# Patient Record
Sex: FEMALE | Race: BLACK OR AFRICAN AMERICAN | NOT HISPANIC OR LATINO | Employment: UNEMPLOYED | URBAN - METROPOLITAN AREA
[De-identification: names, ages, dates, MRNs, and addresses within clinical notes are randomized per-mention and may not be internally consistent; named-entity substitution may affect disease eponyms.]

---

## 2024-02-05 ENCOUNTER — TELEPHONE (OUTPATIENT)
Dept: OBSTETRICS AND GYNECOLOGY | Facility: CLINIC | Age: 59
End: 2024-02-05
Payer: COMMERCIAL

## 2024-02-05 NOTE — TELEPHONE ENCOUNTER
----- Message from Deepika Monroy sent at 2/5/2024 11:55 AM CST -----  Contact: Patient  Type:  Sooner Apoointment Request    Caller is requesting a sooner appointment.  Caller declined first available appointment listed below.  Caller will not accept being placed on the waitlist and is requesting a message be sent to doctor.  Name of Caller:Adri Pérez   When is the first available appointment?07/30  Symptoms:annual   Would the patient rather a call back or a response via [email or web address]? Call back   Best Call Back Number:[phone]  Additional Information: patient prefers the appointment to be on a Tuesday

## 2024-02-27 ENCOUNTER — OFFICE VISIT (OUTPATIENT)
Dept: OBSTETRICS AND GYNECOLOGY | Facility: CLINIC | Age: 59
End: 2024-02-27
Payer: COMMERCIAL

## 2024-02-27 VITALS
HEART RATE: 92 BPM | WEIGHT: 180.63 LBS | HEIGHT: 65 IN | DIASTOLIC BLOOD PRESSURE: 75 MMHG | SYSTOLIC BLOOD PRESSURE: 126 MMHG | BODY MASS INDEX: 30.09 KG/M2

## 2024-02-27 DIAGNOSIS — Z12.4 SCREENING FOR MALIGNANT NEOPLASM OF CERVIX: ICD-10-CM

## 2024-02-27 DIAGNOSIS — Z12.31 SCREENING MAMMOGRAM FOR BREAST CANCER: ICD-10-CM

## 2024-02-27 DIAGNOSIS — R53.83 FATIGUE, UNSPECIFIED TYPE: ICD-10-CM

## 2024-02-27 DIAGNOSIS — Z01.419 WELL WOMAN EXAM WITH ROUTINE GYNECOLOGICAL EXAM: Primary | ICD-10-CM

## 2024-02-27 DIAGNOSIS — R23.2 HOT FLASHES: ICD-10-CM

## 2024-02-27 DIAGNOSIS — Z13.820 SCREENING FOR OSTEOPOROSIS: ICD-10-CM

## 2024-02-27 PROCEDURE — 1159F MED LIST DOCD IN RCRD: CPT | Mod: CPTII,S$GLB,,

## 2024-02-27 PROCEDURE — 99386 PREV VISIT NEW AGE 40-64: CPT | Mod: S$GLB,,,

## 2024-02-27 PROCEDURE — 3074F SYST BP LT 130 MM HG: CPT | Mod: CPTII,S$GLB,,

## 2024-02-27 PROCEDURE — 3078F DIAST BP <80 MM HG: CPT | Mod: CPTII,S$GLB,,

## 2024-02-27 PROCEDURE — 1160F RVW MEDS BY RX/DR IN RCRD: CPT | Mod: CPTII,S$GLB,,

## 2024-02-27 PROCEDURE — 3008F BODY MASS INDEX DOCD: CPT | Mod: CPTII,S$GLB,,

## 2024-02-27 RX ORDER — DULOXETIN HYDROCHLORIDE 60 MG/1
60 CAPSULE, DELAYED RELEASE ORAL 2 TIMES DAILY
COMMUNITY
Start: 2023-12-27

## 2024-02-27 RX ORDER — MELOXICAM 15 MG/1
15 TABLET ORAL
COMMUNITY
Start: 2023-12-27

## 2024-02-27 RX ORDER — AMLODIPINE BESYLATE 10 MG/1
10 TABLET ORAL
COMMUNITY
Start: 2023-12-26

## 2024-02-27 NOTE — PROGRESS NOTES
"  Subjective:      Patient ID: Adri Pérez is a 59 y.o. female who presents for evaluation today.    Chief Complaint:    Well Woman      History of Present Illness  HPI  Annual Exam (Postmenopausal) - Patient presents for annual exam. The patient has also has complaints of decreased libido. The patient is sexually active. GYN screening history: last pap: was normal and last mammogram: was normal. The patient is not taking hormone replacement therapy. Patient denies post-menopausal vaginal bleeding. The patient wears seatbelts: yes. The patient participates in regular exercise: yes. Has the patient ever been transfused or tattooed?: not asked. The patient reports that there is not domestic violence in her life. She reports decreased libido for many years. She is also on cymbalta for anxiety & depression, losartan for HTN. She sees Dr. Santana for management. She denies vaginal dryness, reports daily hot flashes & night sweats, joint pain. She believes she began menopause around age 51. She has never been on HRT. She denies GI or  problems.     GYN History  No LMP recorded (lmp unknown). Patient is postmenopausal.   Date of Last Pap: Pap smear completed today with HPV co-testing    VITALS  /75   Pulse 92   Ht 5' 5" (1.651 m)   Wt 81.9 kg (180 lb 9.6 oz)   LMP  (LMP Unknown)   BMI 30.05 kg/m²   Weight: 81.9 kg (180 lb 9.6 oz)   Height: 5' 5" (165.1 cm)     PAST MEDICAL HISTORY  Past Medical History:   Diagnosis Date    Anxiety disorder, unspecified     Arthritis     Depression     HTN (hypertension)        PAST SURGICAL HISTORY  Past Surgical History:   Procedure Laterality Date    BIOPSY OF MASS      pubic area       SOCIAL HISTORY  Social History     Tobacco Use   Smoking Status Never   Smokeless Tobacco Never   ,   Social History     Substance and Sexual Activity   Alcohol Use Not Currently        MEDICATIONS  Outpatient Medications Marked as Taking for the 2/27/24 encounter (Office Visit) with Jazzy" Brittney DOWNS NP   Medication Sig Dispense Refill    amLODIPine (NORVASC) 10 MG tablet Take 10 mg by mouth.      DULoxetine (CYMBALTA) 60 MG capsule Take 60 mg by mouth 2 (two) times daily.      meloxicam (MOBIC) 15 MG tablet Take 15 mg by mouth.           Review of Systems   Review of Systems   Constitutional:  Negative for activity change.   Eyes:  Negative for visual disturbance.   Respiratory:  Negative for shortness of breath.    Cardiovascular:  Negative for chest pain.   Gastrointestinal:  Negative for abdominal pain.   Genitourinary:  Positive for decreased libido and hot flashes. Negative for vaginal bleeding.        No abnormal vaginal bleeding   Musculoskeletal:  Negative for back pain.   Integumentary:  Negative for rash and breast mass.   Neurological:  Negative for numbness.   Psychiatric/Behavioral:          No mood disturbance or changes    Breast: Negative for mass          Objective:     Physical Exam:   Constitutional: She is oriented to person, place, and time. She appears well-developed. She is cooperative.    HENT:   Head: Normocephalic.     Neck: Trachea normal. No thyromegaly present.    Cardiovascular:  Normal rate, regular rhythm and normal heart sounds.             Pulmonary/Chest: Effort normal and breath sounds normal. Right breast exhibits no mass, no nipple discharge and no skin change. Left breast exhibits no mass, no nipple discharge and no skin change.        Abdominal: Soft. There is no abdominal tenderness. There is no rebound and no guarding.     Genitourinary:    Vagina and uterus normal.      Pelvic exam was performed with patient supine.   The external female genitalia was normal.     Labial bartholins normal.There is no lesion on the right labia. There is no lesion on the left labia. Cervix is normal. Right adnexum displays no mass and no tenderness. Left adnexum displays no mass and no tenderness. Cervix exhibits no discharge.    pap smear completedUterus is not enlarged and  not tender.              Lymphadenopathy:        Head (right side): No submental and no submandibular adenopathy present.        Head (left side): No submental and no submandibular adenopathy present.     She has no cervical adenopathy.    Neurological: She is alert and oriented to person, place, and time.    Skin: Skin is warm.    Psychiatric: She has a normal mood and affect. Her speech is normal and behavior is normal. Thought content normal.          Assessment:        1. Well woman exam with routine gynecological exam    2. Screening for malignant neoplasm of cervix    3. Screening mammogram for breast cancer    4. Screening for osteoporosis    5. Fatigue, unspecified type    6. Hot flashes       Well woman exam with routine gynecological exam  -     Liquid-based pap smear, screening    Screening for malignant neoplasm of cervix  -     Liquid-based pap smear, screening    Screening mammogram for breast cancer  -     Mammo Digital Screening Bilat; Future; Expected date: 02/27/2024    Screening for osteoporosis  -     DXA Bone Density Axial Skeleton 1 or more sites; Future; Expected date: 02/27/2024    Fatigue, unspecified type  -     Testosterone, Free; Future; Expected date: 02/27/2024  -     Testosterone; Future; Expected date: 02/27/2024  -     Vitamin D; Future; Expected date: 02/27/2024    Hot flashes  -     Estradiol; Future; Expected date: 02/27/2024  -     T4, Free; Future; Expected date: 02/27/2024  -     TSH; Future; Expected date: 02/27/2024  -     Follicle Stimulating Hormone; Future; Expected date: 02/27/2024  -     Luteinizing Hormone; Future; Expected date: 02/27/2024  -     Comprehensive Metabolic Panel; Future; Expected date: 02/27/2024         Plan:     Patient instructed to contact the clinic should any questions or concerns arise prior to her next office visit. Patient is happy with the plan of care at this time, verbalizes understanding and denies outstanding questions.       Pap  Mammogram  Self-breast exams  Discussed HRT vs non-hormonal options, will discuss post labs  Consider annual health panel with Primary Care if not done  Colonoscopy as indicated  Bone density discussed  If you don't hear from the office regarding results within 1 week, please call  Follow up in 1 year for annual or sooner as needed  Chaperone present for exam

## 2024-03-04 LAB — Lab: NORMAL

## 2024-03-22 ENCOUNTER — PROCEDURE VISIT (OUTPATIENT)
Dept: OBSTETRICS AND GYNECOLOGY | Facility: CLINIC | Age: 59
End: 2024-03-22
Payer: COMMERCIAL

## 2024-03-22 VITALS
DIASTOLIC BLOOD PRESSURE: 88 MMHG | SYSTOLIC BLOOD PRESSURE: 156 MMHG | WEIGHT: 180.38 LBS | HEART RATE: 97 BPM | BODY MASS INDEX: 30.02 KG/M2

## 2024-03-22 DIAGNOSIS — Z12.4 SCREENING FOR MALIGNANT NEOPLASM OF CERVIX: Primary | ICD-10-CM

## 2024-03-22 DIAGNOSIS — R87.612 LGSIL ON PAP SMEAR OF CERVIX: ICD-10-CM

## 2024-03-22 DIAGNOSIS — N88.9 CERVICAL LESION: ICD-10-CM

## 2024-03-22 PROCEDURE — 57452 EXAM OF CERVIX W/SCOPE: CPT | Mod: S$GLB,,, | Performed by: OBSTETRICS & GYNECOLOGY

## 2024-03-25 ENCOUNTER — TELEPHONE (OUTPATIENT)
Dept: OBSTETRICS AND GYNECOLOGY | Facility: CLINIC | Age: 59
End: 2024-03-25
Payer: COMMERCIAL

## 2024-03-25 NOTE — TELEPHONE ENCOUNTER
Call center transferred a call from patient.  Patient reports having a procedure with Dr. Juarez last week.  She attempt to pay with her [employer] Card and it was denied so she paid with her bank card  Today she noticed she was charged twice, on each card.  Ms. Quintana took the payment and she is calling her supervisor to help her with this charge.  Discussed with patient. She is reassured we will take care of this and someone will give her a call back

## 2024-03-26 NOTE — PROCEDURES
Colposcopy    Date/Time: 3/22/2024 11:00 AM    Performed by: Mata Juarez III, MD  Authorized by: Mata Juarez III, MD      Colposcopy Site:  Cervix     Acetic acid applied and the  cx was evaluated.  She had a small AWFL at 6:00  and a Bx preformed as well an ECC   she linnette very well     A chaperone was present     The findings were discussed with the pt

## 2024-03-27 ENCOUNTER — TELEPHONE (OUTPATIENT)
Dept: OBSTETRICS AND GYNECOLOGY | Facility: CLINIC | Age: 59
End: 2024-03-27
Payer: COMMERCIAL

## 2024-03-27 NOTE — TELEPHONE ENCOUNTER
----- Message from Mata Juarez III, MD sent at 3/26/2024  6:17 PM CDT -----  Please call pap in 6 monthss

## 2024-04-17 ENCOUNTER — TELEPHONE (OUTPATIENT)
Dept: OBSTETRICS AND GYNECOLOGY | Facility: CLINIC | Age: 59
End: 2024-04-17
Payer: COMMERCIAL

## 2024-04-17 NOTE — TELEPHONE ENCOUNTER
----- Message from Gina Jacobson sent at 4/17/2024  2:49 PM CDT -----  Contact: Adri Rush needs call back at [phone], Regards to going over  her results.    Thanks  Td

## 2024-04-17 NOTE — TELEPHONE ENCOUNTER
Spoke with patient. I explained results to her that the pap and the biopsy showed the same changes. I explained that Larry would like for her to repeat pap in 6 months. Repeat pap is scheduled.